# Patient Record
Sex: MALE | Race: WHITE | NOT HISPANIC OR LATINO | Employment: FULL TIME | URBAN - METROPOLITAN AREA
[De-identification: names, ages, dates, MRNs, and addresses within clinical notes are randomized per-mention and may not be internally consistent; named-entity substitution may affect disease eponyms.]

---

## 2020-08-19 LAB
ALT SERPL-CCNC: 65 U/L (ref 7–55)
AST SERPL-CCNC: 45 U/L (ref 8–48)

## 2020-09-17 LAB — COLOGUARD-ABSTRACT: POSITIVE

## 2021-06-02 ENCOUNTER — TRANSFERRED RECORDS (OUTPATIENT)
Dept: HEALTH INFORMATION MANAGEMENT | Facility: CLINIC | Age: 66
End: 2021-06-02

## 2021-06-11 ENCOUNTER — RECORDS - HEALTHEAST (OUTPATIENT)
Dept: ADMINISTRATIVE | Facility: OTHER | Age: 66
End: 2021-06-11

## 2021-06-29 DIAGNOSIS — Z11.59 ENCOUNTER FOR SCREENING FOR OTHER VIRAL DISEASES: ICD-10-CM

## 2021-07-12 ENCOUNTER — TRANSFERRED RECORDS (OUTPATIENT)
Dept: MULTI SPECIALTY CLINIC | Facility: CLINIC | Age: 66
End: 2021-07-12

## 2021-07-12 LAB
CREATININE (EXTERNAL): 1.06 MG/DL (ref 0.74–1.35)
GFR ESTIMATED (EXTERNAL): 74 ML/MIN/BSA
GFR ESTIMATED (IF AFRICAN AMERICAN) (EXTERNAL): 85 ML/MIN/BSA
GLUCOSE (EXTERNAL): 120 MG/DL (ref 70–140)
POTASSIUM (EXTERNAL): 3.4 MMOL/L (ref 3.6–5.2)

## 2021-07-21 RX ORDER — ATENOLOL AND CHLORTHALIDONE TABLET 50; 25 MG/1; MG/1
1 TABLET ORAL 2 TIMES DAILY
COMMUNITY

## 2021-07-21 RX ORDER — INDOMETHACIN 50 MG/1
50 CAPSULE ORAL 3 TIMES DAILY PRN
COMMUNITY

## 2021-07-21 RX ORDER — POTASSIUM CHLORIDE 1500 MG/1
20 TABLET, EXTENDED RELEASE ORAL
COMMUNITY

## 2021-07-21 ASSESSMENT — MIFFLIN-ST. JEOR: SCORE: 2047.05

## 2021-07-21 NOTE — PROVIDER NOTIFICATION
07/21/21 0902   Discharge Planning   Patient/Family Anticipates Transition to home with family   Living Arrangements   People in home spouse   Type of Residence Private Residence   Is your private residence a single family home or apartment? Single family home   Number of Stairs, Within Home, Primary none   Once home, are you able to live on one level? Yes   Which level? Main Level   Bathroom Shower/Tub Walk-in shower   Equipment Currently Used at Home shower chair;grab bar, tub/shower;raised toilet seat  (walker and cane at home to use)   Support System   Support Systems Spouse/Significant Other;Family Members   Do you have someone available to stay with you one or two nights once you are home? Yes   Blood   Known bleeding disorder or coagulopathy? No   Education   Patient attended total joint pre-op class/received pre-op teaching  email/phone call

## 2021-07-26 ENCOUNTER — ANESTHESIA EVENT (OUTPATIENT)
Dept: SURGERY | Facility: CLINIC | Age: 66
End: 2021-07-26
Payer: COMMERCIAL

## 2021-07-26 RX ORDER — CEFAZOLIN SODIUM IN 0.9 % NACL 3 G/100 ML
3 INTRAVENOUS SOLUTION, PIGGYBACK (ML) INTRAVENOUS SEE ADMIN INSTRUCTIONS
Status: DISCONTINUED | OUTPATIENT
Start: 2021-07-27 | End: 2021-07-27 | Stop reason: HOSPADM

## 2021-07-26 RX ORDER — CEFAZOLIN SODIUM IN 0.9 % NACL 3 G/100 ML
3 INTRAVENOUS SOLUTION, PIGGYBACK (ML) INTRAVENOUS
Status: COMPLETED | OUTPATIENT
Start: 2021-07-27 | End: 2021-07-27

## 2021-07-27 ENCOUNTER — APPOINTMENT (OUTPATIENT)
Dept: RADIOLOGY | Facility: CLINIC | Age: 66
End: 2021-07-27
Attending: ORTHOPAEDIC SURGERY
Payer: COMMERCIAL

## 2021-07-27 ENCOUNTER — HOSPITAL ENCOUNTER (OUTPATIENT)
Facility: CLINIC | Age: 66
Discharge: HOME | End: 2021-07-28
Attending: ORTHOPAEDIC SURGERY | Admitting: ORTHOPAEDIC SURGERY
Payer: COMMERCIAL

## 2021-07-27 ENCOUNTER — ANESTHESIA (OUTPATIENT)
Dept: SURGERY | Facility: CLINIC | Age: 66
End: 2021-07-27
Payer: COMMERCIAL

## 2021-07-27 ENCOUNTER — APPOINTMENT (OUTPATIENT)
Dept: PHYSICAL THERAPY | Facility: CLINIC | Age: 66
End: 2021-07-27
Attending: ORTHOPAEDIC SURGERY
Payer: COMMERCIAL

## 2021-07-27 DIAGNOSIS — M16.11 PRIMARY OSTEOARTHRITIS OF RIGHT HIP: Primary | ICD-10-CM

## 2021-07-27 PROBLEM — M16.10 HIP ARTHRITIS: Status: ACTIVE | Noted: 2021-07-27

## 2021-07-27 LAB — HOLD SPECIMEN: NORMAL

## 2021-07-27 PROCEDURE — 258N000003 HC RX IP 258 OP 636: Performed by: ANESTHESIOLOGY

## 2021-07-27 PROCEDURE — 250N000009 HC RX 250: Performed by: ORTHOPAEDIC SURGERY

## 2021-07-27 PROCEDURE — 258N000003 HC RX IP 258 OP 636: Performed by: ORTHOPAEDIC SURGERY

## 2021-07-27 PROCEDURE — 278N000051 HC OR IMPLANT GENERAL: Performed by: ORTHOPAEDIC SURGERY

## 2021-07-27 PROCEDURE — C1713 ANCHOR/SCREW BN/BN,TIS/BN: HCPCS | Performed by: ORTHOPAEDIC SURGERY

## 2021-07-27 PROCEDURE — 250N000011 HC RX IP 250 OP 636: Performed by: ANESTHESIOLOGY

## 2021-07-27 PROCEDURE — 710N000010 HC RECOVERY PHASE 1, LEVEL 2, PER MIN: Performed by: ORTHOPAEDIC SURGERY

## 2021-07-27 PROCEDURE — 272N000001 HC OR GENERAL SUPPLY STERILE: Performed by: ORTHOPAEDIC SURGERY

## 2021-07-27 PROCEDURE — 97116 GAIT TRAINING THERAPY: CPT | Mod: GP

## 2021-07-27 PROCEDURE — 250N000011 HC RX IP 250 OP 636: Performed by: ORTHOPAEDIC SURGERY

## 2021-07-27 PROCEDURE — 250N000013 HC RX MED GY IP 250 OP 250 PS 637: Performed by: ORTHOPAEDIC SURGERY

## 2021-07-27 PROCEDURE — 97110 THERAPEUTIC EXERCISES: CPT | Mod: GP

## 2021-07-27 PROCEDURE — 250N000013 HC RX MED GY IP 250 OP 250 PS 637: Performed by: ANESTHESIOLOGY

## 2021-07-27 PROCEDURE — 97161 PT EVAL LOW COMPLEX 20 MIN: CPT | Mod: GP

## 2021-07-27 PROCEDURE — 36415 COLL VENOUS BLD VENIPUNCTURE: CPT | Performed by: ORTHOPAEDIC SURGERY

## 2021-07-27 PROCEDURE — 250N000009 HC RX 250: Performed by: PHYSICIAN ASSISTANT

## 2021-07-27 PROCEDURE — 250N000009 HC RX 250: Performed by: NURSE ANESTHETIST, CERTIFIED REGISTERED

## 2021-07-27 PROCEDURE — 250N000011 HC RX IP 250 OP 636: Performed by: NURSE ANESTHETIST, CERTIFIED REGISTERED

## 2021-07-27 PROCEDURE — 370N000017 HC ANESTHESIA TECHNICAL FEE, PER MIN: Performed by: ORTHOPAEDIC SURGERY

## 2021-07-27 PROCEDURE — 250N000013 HC RX MED GY IP 250 OP 250 PS 637: Performed by: PHYSICIAN ASSISTANT

## 2021-07-27 PROCEDURE — C1776 JOINT DEVICE (IMPLANTABLE): HCPCS | Performed by: ORTHOPAEDIC SURGERY

## 2021-07-27 PROCEDURE — 250N000009 HC RX 250: Performed by: ANESTHESIOLOGY

## 2021-07-27 PROCEDURE — 999N000065 XR PELVIS AND HIP LEFT 2 VIEWS

## 2021-07-27 PROCEDURE — 360N000077 HC SURGERY LEVEL 4, PER MIN: Performed by: ORTHOPAEDIC SURGERY

## 2021-07-27 PROCEDURE — 258N000001 HC RX 258: Performed by: ORTHOPAEDIC SURGERY

## 2021-07-27 PROCEDURE — 999N000141 HC STATISTIC PRE-PROCEDURE NURSING ASSESSMENT: Performed by: ORTHOPAEDIC SURGERY

## 2021-07-27 PROCEDURE — 250N000011 HC RX IP 250 OP 636: Performed by: PHYSICIAN ASSISTANT

## 2021-07-27 DEVICE — IMP HOLE COVER SNN ACETAB CUP REFLEC INTERFIT 71336500: Type: IMPLANTABLE DEVICE | Site: HIP | Status: FUNCTIONAL

## 2021-07-27 DEVICE — IMP FEMORAL SLEEVE S&N SHORT MOD +0MM NECK TI 71344247: Type: IMPLANTABLE DEVICE | Site: HIP | Status: FUNCTIONAL

## 2021-07-27 DEVICE — IMP SCR ACET SNN SPHERICAL HEAD 6.5X25MM 71332525: Type: IMPLANTABLE DEVICE | Site: HIP | Status: FUNCTIONAL

## 2021-07-27 DEVICE — IMP SHELL SNR ACET R3 3H 58MM 71335558: Type: IMPLANTABLE DEVICE | Site: HIP | Status: FUNCTIONAL

## 2021-07-27 DEVICE — IMP SCR ACET SNN SPHERICAL HEAD 6.5X15MM 71332515: Type: IMPLANTABLE DEVICE | Site: HIP | Status: FUNCTIONAL

## 2021-07-27 DEVICE — IMPLANTABLE DEVICE: Type: IMPLANTABLE DEVICE | Site: HIP | Status: FUNCTIONAL

## 2021-07-27 DEVICE — HEAD OX MODULAR 40MM: Type: IMPLANTABLE DEVICE | Site: HIP | Status: FUNCTIONAL

## 2021-07-27 RX ORDER — PROPOFOL 10 MG/ML
INJECTION, EMULSION INTRAVENOUS CONTINUOUS PRN
Status: DISCONTINUED | OUTPATIENT
Start: 2021-07-27 | End: 2021-07-27

## 2021-07-27 RX ORDER — TRANEXAMIC ACID 650 MG/1
1950 TABLET ORAL ONCE
Status: COMPLETED | OUTPATIENT
Start: 2021-07-27 | End: 2021-07-27

## 2021-07-27 RX ORDER — ONDANSETRON 4 MG/1
4 TABLET, ORALLY DISINTEGRATING ORAL EVERY 6 HOURS PRN
Status: DISCONTINUED | OUTPATIENT
Start: 2021-07-27 | End: 2021-07-28 | Stop reason: HOSPADM

## 2021-07-27 RX ORDER — ACETAMINOPHEN 325 MG/1
975 TABLET ORAL EVERY 8 HOURS
Status: DISCONTINUED | OUTPATIENT
Start: 2021-07-27 | End: 2021-07-28 | Stop reason: HOSPADM

## 2021-07-27 RX ORDER — LIDOCAINE 40 MG/G
CREAM TOPICAL
Status: DISCONTINUED | OUTPATIENT
Start: 2021-07-27 | End: 2021-07-28 | Stop reason: HOSPADM

## 2021-07-27 RX ORDER — EPHEDRINE SULFATE 50 MG/ML
INJECTION, SOLUTION INTRAMUSCULAR; INTRAVENOUS; SUBCUTANEOUS PRN
Status: DISCONTINUED | OUTPATIENT
Start: 2021-07-27 | End: 2021-07-27

## 2021-07-27 RX ORDER — POLYETHYLENE GLYCOL 3350 17 G/17G
17 POWDER, FOR SOLUTION ORAL DAILY
Status: DISCONTINUED | OUTPATIENT
Start: 2021-07-28 | End: 2021-07-28 | Stop reason: HOSPADM

## 2021-07-27 RX ORDER — SODIUM CHLORIDE, SODIUM LACTATE, POTASSIUM CHLORIDE, CALCIUM CHLORIDE 600; 310; 30; 20 MG/100ML; MG/100ML; MG/100ML; MG/100ML
INJECTION, SOLUTION INTRAVENOUS CONTINUOUS
Status: DISCONTINUED | OUTPATIENT
Start: 2021-07-27 | End: 2021-07-27 | Stop reason: HOSPADM

## 2021-07-27 RX ORDER — ASPIRIN 81 MG/1
81 TABLET ORAL 2 TIMES DAILY
Status: DISCONTINUED | OUTPATIENT
Start: 2021-07-27 | End: 2021-07-28 | Stop reason: HOSPADM

## 2021-07-27 RX ORDER — HYDROMORPHONE HCL IN WATER/PF 6 MG/30 ML
0.2 PATIENT CONTROLLED ANALGESIA SYRINGE INTRAVENOUS
Status: DISCONTINUED | OUTPATIENT
Start: 2021-07-27 | End: 2021-07-28 | Stop reason: HOSPADM

## 2021-07-27 RX ORDER — GLYCOPYRROLATE 0.2 MG/ML
INJECTION, SOLUTION INTRAMUSCULAR; INTRAVENOUS PRN
Status: DISCONTINUED | OUTPATIENT
Start: 2021-07-27 | End: 2021-07-27

## 2021-07-27 RX ORDER — PROCHLORPERAZINE MALEATE 10 MG
10 TABLET ORAL EVERY 6 HOURS PRN
Status: DISCONTINUED | OUTPATIENT
Start: 2021-07-27 | End: 2021-07-28 | Stop reason: HOSPADM

## 2021-07-27 RX ORDER — NALOXONE HYDROCHLORIDE 0.4 MG/ML
0.2 INJECTION, SOLUTION INTRAMUSCULAR; INTRAVENOUS; SUBCUTANEOUS
Status: DISCONTINUED | OUTPATIENT
Start: 2021-07-27 | End: 2021-07-28 | Stop reason: HOSPADM

## 2021-07-27 RX ORDER — ACETAMINOPHEN 325 MG/1
650 TABLET ORAL EVERY 4 HOURS PRN
Status: DISCONTINUED | OUTPATIENT
Start: 2021-07-30 | End: 2021-07-28 | Stop reason: HOSPADM

## 2021-07-27 RX ORDER — SODIUM CHLORIDE, SODIUM LACTATE, POTASSIUM CHLORIDE, CALCIUM CHLORIDE 600; 310; 30; 20 MG/100ML; MG/100ML; MG/100ML; MG/100ML
INJECTION, SOLUTION INTRAVENOUS CONTINUOUS
Status: DISCONTINUED | OUTPATIENT
Start: 2021-07-27 | End: 2021-07-28 | Stop reason: HOSPADM

## 2021-07-27 RX ORDER — MAGNESIUM SULFATE 4 G/50ML
4 INJECTION INTRAVENOUS ONCE
Status: COMPLETED | OUTPATIENT
Start: 2021-07-27 | End: 2021-07-28

## 2021-07-27 RX ORDER — FENTANYL CITRATE 50 UG/ML
INJECTION, SOLUTION INTRAMUSCULAR; INTRAVENOUS PRN
Status: DISCONTINUED | OUTPATIENT
Start: 2021-07-27 | End: 2021-07-27

## 2021-07-27 RX ORDER — FENTANYL CITRATE 50 UG/ML
50 INJECTION, SOLUTION INTRAMUSCULAR; INTRAVENOUS EVERY 5 MIN PRN
Status: DISCONTINUED | OUTPATIENT
Start: 2021-07-27 | End: 2021-07-27 | Stop reason: HOSPADM

## 2021-07-27 RX ORDER — CEFAZOLIN SODIUM 2 G/100ML
2 INJECTION, SOLUTION INTRAVENOUS EVERY 8 HOURS
Status: COMPLETED | OUTPATIENT
Start: 2021-07-27 | End: 2021-07-28

## 2021-07-27 RX ORDER — OXYCODONE HYDROCHLORIDE 5 MG/1
10 TABLET ORAL EVERY 4 HOURS PRN
Status: DISCONTINUED | OUTPATIENT
Start: 2021-07-27 | End: 2021-07-28 | Stop reason: HOSPADM

## 2021-07-27 RX ORDER — NALOXONE HYDROCHLORIDE 0.4 MG/ML
0.4 INJECTION, SOLUTION INTRAMUSCULAR; INTRAVENOUS; SUBCUTANEOUS
Status: DISCONTINUED | OUTPATIENT
Start: 2021-07-27 | End: 2021-07-28 | Stop reason: HOSPADM

## 2021-07-27 RX ORDER — BUPIVACAINE HYDROCHLORIDE 7.5 MG/ML
INJECTION, SOLUTION INTRASPINAL PRN
Status: DISCONTINUED | OUTPATIENT
Start: 2021-07-27 | End: 2021-07-27

## 2021-07-27 RX ORDER — BISACODYL 10 MG
10 SUPPOSITORY, RECTAL RECTAL DAILY PRN
Status: DISCONTINUED | OUTPATIENT
Start: 2021-07-27 | End: 2021-07-28 | Stop reason: HOSPADM

## 2021-07-27 RX ORDER — BUPIVACAINE HYDROCHLORIDE 7.5 MG/ML
INJECTION, SOLUTION INTRASPINAL
Status: COMPLETED | OUTPATIENT
Start: 2021-07-27 | End: 2021-07-27

## 2021-07-27 RX ORDER — MAGNESIUM HYDROXIDE 1200 MG/15ML
LIQUID ORAL PRN
Status: DISCONTINUED | OUTPATIENT
Start: 2021-07-27 | End: 2021-07-27 | Stop reason: HOSPADM

## 2021-07-27 RX ORDER — ONDANSETRON 4 MG/1
4 TABLET, ORALLY DISINTEGRATING ORAL EVERY 30 MIN PRN
Status: DISCONTINUED | OUTPATIENT
Start: 2021-07-27 | End: 2021-07-27 | Stop reason: HOSPADM

## 2021-07-27 RX ORDER — DEXAMETHASONE SODIUM PHOSPHATE 10 MG/ML
INJECTION, SOLUTION INTRAMUSCULAR; INTRAVENOUS PRN
Status: DISCONTINUED | OUTPATIENT
Start: 2021-07-27 | End: 2021-07-27

## 2021-07-27 RX ORDER — ACETAMINOPHEN 325 MG/1
975 TABLET ORAL ONCE
Status: COMPLETED | OUTPATIENT
Start: 2021-07-27 | End: 2021-07-27

## 2021-07-27 RX ORDER — OXYCODONE HYDROCHLORIDE 5 MG/1
5 TABLET ORAL EVERY 4 HOURS PRN
Status: DISCONTINUED | OUTPATIENT
Start: 2021-07-27 | End: 2021-07-28 | Stop reason: HOSPADM

## 2021-07-27 RX ORDER — ONDANSETRON 2 MG/ML
4 INJECTION INTRAMUSCULAR; INTRAVENOUS EVERY 30 MIN PRN
Status: DISCONTINUED | OUTPATIENT
Start: 2021-07-27 | End: 2021-07-27 | Stop reason: HOSPADM

## 2021-07-27 RX ORDER — ONDANSETRON 2 MG/ML
4 INJECTION INTRAMUSCULAR; INTRAVENOUS EVERY 6 HOURS PRN
Status: DISCONTINUED | OUTPATIENT
Start: 2021-07-27 | End: 2021-07-28 | Stop reason: HOSPADM

## 2021-07-27 RX ORDER — HYDROMORPHONE HCL IN WATER/PF 6 MG/30 ML
0.4 PATIENT CONTROLLED ANALGESIA SYRINGE INTRAVENOUS
Status: DISCONTINUED | OUTPATIENT
Start: 2021-07-27 | End: 2021-07-28 | Stop reason: HOSPADM

## 2021-07-27 RX ORDER — HYDROMORPHONE HCL IN WATER/PF 6 MG/30 ML
0.2 PATIENT CONTROLLED ANALGESIA SYRINGE INTRAVENOUS EVERY 5 MIN PRN
Status: DISCONTINUED | OUTPATIENT
Start: 2021-07-27 | End: 2021-07-27 | Stop reason: HOSPADM

## 2021-07-27 RX ORDER — AMOXICILLIN 250 MG
1 CAPSULE ORAL 2 TIMES DAILY
Status: DISCONTINUED | OUTPATIENT
Start: 2021-07-27 | End: 2021-07-28 | Stop reason: HOSPADM

## 2021-07-27 RX ORDER — ONDANSETRON 2 MG/ML
INJECTION INTRAMUSCULAR; INTRAVENOUS PRN
Status: DISCONTINUED | OUTPATIENT
Start: 2021-07-27 | End: 2021-07-27

## 2021-07-27 RX ORDER — LIDOCAINE 40 MG/G
CREAM TOPICAL
Status: DISCONTINUED | OUTPATIENT
Start: 2021-07-27 | End: 2021-07-27 | Stop reason: HOSPADM

## 2021-07-27 RX ADMIN — ACETAMINOPHEN 975 MG: 325 TABLET ORAL at 06:13

## 2021-07-27 RX ADMIN — SODIUM CHLORIDE, POTASSIUM CHLORIDE, SODIUM LACTATE AND CALCIUM CHLORIDE: 600; 310; 30; 20 INJECTION, SOLUTION INTRAVENOUS at 14:18

## 2021-07-27 RX ADMIN — ACETAMINOPHEN 975 MG: 325 TABLET ORAL at 14:39

## 2021-07-27 RX ADMIN — GLYCOPYRROLATE 0.3 MG: 0.2 INJECTION, SOLUTION INTRAMUSCULAR; INTRAVENOUS at 07:34

## 2021-07-27 RX ADMIN — MIDAZOLAM HYDROCHLORIDE 2 MG: 1 INJECTION, SOLUTION INTRAMUSCULAR; INTRAVENOUS at 07:22

## 2021-07-27 RX ADMIN — ONDANSETRON 4 MG: 2 INJECTION INTRAMUSCULAR; INTRAVENOUS at 07:37

## 2021-07-27 RX ADMIN — Medication 5 MG: at 08:20

## 2021-07-27 RX ADMIN — ACETAMINOPHEN 975 MG: 325 TABLET ORAL at 23:10

## 2021-07-27 RX ADMIN — MAGNESIUM SULFATE HEPTAHYDRATE 4 G: 80 INJECTION, SOLUTION INTRAVENOUS at 06:46

## 2021-07-27 RX ADMIN — DOCUSATE SODIUM 50MG AND SENNOSIDES 8.6MG 1 TABLET: 8.6; 5 TABLET, FILM COATED ORAL at 14:41

## 2021-07-27 RX ADMIN — ASPIRIN 81 MG: 81 TABLET ORAL at 14:39

## 2021-07-27 RX ADMIN — DEXAMETHASONE SODIUM PHOSPHATE 10 MG: 10 INJECTION, SOLUTION INTRAMUSCULAR; INTRAVENOUS at 07:33

## 2021-07-27 RX ADMIN — DOCUSATE SODIUM 50MG AND SENNOSIDES 8.6MG 1 TABLET: 8.6; 5 TABLET, FILM COATED ORAL at 20:38

## 2021-07-27 RX ADMIN — Medication 10 MG: at 07:54

## 2021-07-27 RX ADMIN — FENTANYL CITRATE 100 MCG: 50 INJECTION, SOLUTION INTRAMUSCULAR; INTRAVENOUS at 07:26

## 2021-07-27 RX ADMIN — SODIUM CHLORIDE, POTASSIUM CHLORIDE, SODIUM LACTATE AND CALCIUM CHLORIDE: 600; 310; 30; 20 INJECTION, SOLUTION INTRAVENOUS at 08:20

## 2021-07-27 RX ADMIN — BUPIVACAINE HYDROCHLORIDE IN DEXTROSE 2 ML: 7.5 INJECTION, SOLUTION SUBARACHNOID at 07:32

## 2021-07-27 RX ADMIN — TRANEXAMIC ACID 1950 MG: 650 TABLET ORAL at 06:13

## 2021-07-27 RX ADMIN — Medication 10 MG: at 08:06

## 2021-07-27 RX ADMIN — ASPIRIN 81 MG: 81 TABLET ORAL at 20:38

## 2021-07-27 RX ADMIN — CEFAZOLIN SODIUM 2 G: 2 INJECTION, SOLUTION INTRAVENOUS at 23:10

## 2021-07-27 RX ADMIN — CEFAZOLIN SODIUM 2 G: 2 INJECTION, SOLUTION INTRAVENOUS at 14:40

## 2021-07-27 RX ADMIN — SODIUM CHLORIDE, POTASSIUM CHLORIDE, SODIUM LACTATE AND CALCIUM CHLORIDE: 600; 310; 30; 20 INJECTION, SOLUTION INTRAVENOUS at 06:46

## 2021-07-27 RX ADMIN — LIDOCAINE HYDROCHLORIDE 30 ML: 10 INJECTION, SOLUTION INFILTRATION; PERINEURAL at 07:36

## 2021-07-27 RX ADMIN — PROPOFOL 80 MCG/KG/MIN: 10 INJECTION, EMULSION INTRAVENOUS at 07:31

## 2021-07-27 RX ADMIN — Medication 3 G: at 07:30

## 2021-07-27 ASSESSMENT — MIFFLIN-ST. JEOR: SCORE: 1988.3

## 2021-07-27 NOTE — ANESTHESIA POSTPROCEDURE EVALUATION
Patient: Aron Jones    Procedure(s):  LEFT TOTAL HIP ARTHROPLASTY    Diagnosis:Osteoarthritis of left hip [M16.12]  Diagnosis Additional Information: No value filed.    Anesthesia Type:  Spinal    Note:     Postop Pain Control: Uneventful            Sign Out: Well controlled pain   PONV: No   Neuro/Psych: Uneventful            Sign Out: Acceptable/Baseline neuro status   Airway/Respiratory: Uneventful            Sign Out: AIRWAY IN SITU/Resp. Support   CV/Hemodynamics: Uneventful            Sign Out: Acceptable CV status; No obvious hypovolemia; No obvious fluid overload   Other NRE: NONE   DID A NON-ROUTINE EVENT OCCUR? No           Last vitals:  Vitals Value Taken Time   /61 07/27/21 1120   Temp 36.4  C (97.6  F) 07/27/21 1100   Pulse 50 07/27/21 1121   Resp 20 07/27/21 1100   SpO2 97 % 07/27/21 1121   Vitals shown include unvalidated device data.    Electronically Signed By: Mehrdad Martinez MD  July 27, 2021  2:11 PM

## 2021-07-27 NOTE — ANESTHESIA PREPROCEDURE EVALUATION
Anesthesia Pre-Procedure Evaluation    Patient: Aron Jones   MRN: [id number] : 1955        Preoperative Diagnosis: Osteoarthritis of left hip [M16.12]   Procedure : Procedure(s):  LEFT TOTAL HIP ARTHROPLASTY     Past Medical History:   Diagnosis Date     Arthritis      Hemochromatosis      Hypertension       History reviewed. No pertinent surgical history.   No Known Allergies   Social History     Tobacco Use     Smoking status: Former Smoker     Types: Cigars     Smokeless tobacco: Never Used   Substance Use Topics     Alcohol use: Yes     Comment: 2 drinks a month      Wt Readings from Last 1 Encounters:   21 122.4 kg (269 lb 12.8 oz)        Anesthesia Evaluation            ROS/MED HX  ENT/Pulmonary:  - neg pulmonary ROS     Neurologic:  - neg neurologic ROS     Cardiovascular:     (+) hypertension-----    METS/Exercise Tolerance:     Hematologic:  - neg hematologic  ROS     Musculoskeletal:  - neg musculoskeletal ROS     GI/Hepatic:  - neg GI/hepatic ROS     Renal/Genitourinary:  - neg Renal ROS     Endo: Comment: Hemochromatosis    (+) Obesity (morbid, BMI>40),     Psychiatric/Substance Use:  - neg psychiatric ROS     Infectious Disease:  - neg infectious disease ROS     Malignancy:  - neg malignancy ROS     Other:  - neg other ROS          Physical Exam    Airway        Mallampati: III   TM distance: > 3 FB   Neck ROM: full     Respiratory Devices and Support         Dental       (+) upper dentures      Cardiovascular          Rhythm and rate: regular and normal     Pulmonary           breath sounds clear to auscultation           OUTSIDE LABS:  CBC: No results found for: WBC, HGB, HCT, PLT  BMP: No results found for: NA, POTASSIUM, CHLORIDE, CO2, BUN, CR, GLC  COAGS: No results found for: PTT, INR, FIBR  POC: No results found for: BGM, HCG, HCGS  HEPATIC: No results found for: ALBUMIN, PROTTOTAL, ALT, AST, GGT, ALKPHOS, BILITOTAL, BILIDIRECT, DORENE  OTHER: No results found for: PH,  LACT, A1C, FANTASMA, PHOS, MAG, LIPASE, AMYLASE, TSH, T4, T3, CRP, SED    Anesthesia Plan    ASA Status:  3   NPO Status:  NPO Appropriate    Anesthesia Type: Spinal.              Consents    Anesthesia Plan(s) and associated risks, benefits, and realistic alternatives discussed. Questions answered and patient/representative(s) expressed understanding.     - Discussed with:  Patient         Postoperative Care    Pain management: IV analgesics, Oral pain medications.   PONV prophylaxis: Ondansetron (or other 5HT-3), Dexamethasone or Solumedrol     Comments:                Mehrdad Martinez MD

## 2021-07-27 NOTE — ANESTHESIA CARE TRANSFER NOTE
Patient: Aron Jones    Procedure(s):  LEFT TOTAL HIP ARTHROPLASTY    Diagnosis: Osteoarthritis of left hip [M16.12]  Diagnosis Additional Information: No value filed.    Anesthesia Type:   Spinal     Note:    Oropharynx: spontaneously breathing and oropharynx clear of all foreign objects  Level of Consciousness: awake  Oxygen Supplementation: face mask  Level of Supplemental Oxygen (L/min / FiO2): 6      Vital Signs Stable: post-procedure vital signs reviewed and stable  Report to RN Given: handoff report given  Patient transferred to: PACU    Handoff Report: Identifed the Patient, Identified the Reponsible Provider, Reviewed the pertinent medical history, Discussed the surgical course, Reviewed Intra-OP anesthesia mangement and issues during anesthesia, Set expectations for post-procedure period and Allowed opportunity for questions and acknowledgement of understanding      Vitals:  Vitals Value Taken Time   /59 07/27/21 0938   Temp 35.6  C (96.1  F) 07/27/21 0938   Pulse 55 07/27/21 0938   Resp 18 07/27/21 0938   SpO2 99 % 07/27/21 0938       Electronically Signed By: KIMBERLY Bustamante CRNA  July 27, 2021  9:39 AM

## 2021-07-27 NOTE — ANESTHESIA PROCEDURE NOTES
Intrathecal Procedure Note  Pre-Procedure   Staff -        Anesthesiologist:  Mehrdad Martinez MD       Performed By: anesthesiologist       Location: OR       Procedure Start/Stop Times: 7/27/2021 7:28 AM and 7/27/2021 7:32 AM       Pre-Anesthestic Checklist: patient identified, IV checked, risks and benefits discussed, informed consent, monitors and equipment checked, pre-op evaluation and at physician/surgeon's request  Timeout:       Correct Patient: Yes        Correct Procedure: Yes        Correct Site: Yes        Correct Position: Yes   Procedure Documentation  Procedure: intrathecal       Patient Position: sitting       Patient Prep/Sterile Barriers: sterile gloves, mask, patient draped       Skin prep: Chloraprep       Insertion Site: L3-4. (midline approach).       Needle Gauge: 24.        Needle Length (Inches): 3.5        Spinal Needle Type: Mela tip       Introducer used       Introducer: 20 G      # of attempts: 2 and  # of redirects:     Assessment/Narrative         Paresthesias: No.       CSF fluid: clear.    Medication(s) Administered   0.75% Hyperbaric Bupivacaine (Intrathecal), 2 mL  Medication Administration Time: 7/27/2021 7:32 AM

## 2021-07-27 NOTE — OR NURSING
Total hip arthroplasty bone cuts will be disposed of at the end of procedure in a yellow hazard bag. Surgeon stated the bone did not need to be sent to pathology.

## 2021-07-27 NOTE — BRIEF OP NOTE
[facility]    Brief Operative Note    Pre-operative diagnosis: Osteoarthritis of left hip [M16.12]  Post-operative diagnosis Same as pre-operative diagnosis    Procedure: Procedure(s):  LEFT TOTAL HIP ARTHROPLASTY  Surgeon: Surgeon(s) and Role:     * Erasto Max MD - Primary     * Ugo Chris PA-C - Assisting  Anesthesia: Spinal   Estimated blood loss: 200 ml  Drains: Hemovac  Specimens: None  Findings:   None.  Complications: None.  Implants:   Implant Name Type Inv. Item Serial No.  Lot No. LRB No. Used Action   GUIDE PIN LONG ACETABULAR - [id number] Wire GUIDE PIN LONG ACETABULAR  [employer] [id number] Left 1 Implanted   IMP SHELL SNR ACET R3 3H 58MM [id number] - [id number] Total Joint Component/Insert IMP SHELL SNR ACET R3 3H 58MM [id number]  [employer] [id number] Left 1 Implanted   LINER R3 20 DEG +4 XLPE ACET [id number] - [id number] Total Joint Component/Insert LINER R3 20 DEG +4 XLPE ACET [id number]  [employer] [id number] Left 1 Implanted   IMP HOLE COVER SNN ACETAB CUP REFLEC INTERFIT [id number] - [id number] Metallic Hardware/[place] IMP HOLE COVER SNN ACETAB CUP REFLEC INTERFIT [id number]  [employer] [id number] Left 1 Implanted   IMP SCR ACET SNN SPHERICAL HEAD 6.5X15MM [id number] - [id number] Metallic Hardware/[place] IMP SCR ACET SNN SPHERICAL HEAD 6.5X15MM [id number]  [employer] [id number] Left 1 Implanted   IMP SCR ACET SNN SPHERICAL HEAD 6.5X25MM [id number] - [id number] Metallic Hardware/[place] IMP SCR ACET SNN SPHERICAL HEAD 6.5X25MM [id number]  [employer] [id number] Left 1 Implanted   IMP STEM SNN SYNERGY STD POR PLUS SZ 15 165MM [id number] - [id number] Total Joint Component/Insert IMP STEM SNN SYNERGY STD POR PLUS SZ 15 165MM [id number]  [employer] [id number] Left 1 Implanted   IMP FEMORAL SLEEVE S&N SHORT MOD +0MM NECK TI [id number] - [id number] Total Joint Component/Insert IMP FEMORAL SLEEVE S&N SHORT MOD +0MM NECK  TI [id number]  [employer] [id number] Left 1 Implanted   HEAD OX MODULAR 40MM - [id number] Total Joint Component/Insert HEAD OX MODULAR 40MM  [employer] [id number] Left 1 Implanted       Plan:    WBAT  F/U in 2 weeks  Leave Aquacel for 10 days  DVT Prophylaxis:  ASA 81 mg PO BID for 40 day

## 2021-07-27 NOTE — OP NOTE
Procedure Date: 07/27/2021    PREOPERATIVE DIAGNOSIS:  Left hip primary osteoarthritis.    POSTOPERATIVE DIAGNOSIS:  Left hip primary osteoarthritis.    PROCEDURE:  Left MIS total hip arthroplasty.    SURGEON:  Erasto Max MD    ASSISTANT:  Ugo Chris PA-C (PA, was medically indicated and necessary to allow for the safe and adequate progression of surgery).    ESTIMATED BLOOD LOSS:  150 mL    COMPLICATIONS:  None.    SPECIMENS:  None.    ANESTHETIC:  Spinal.    IMPLANTS USED:  Smith and Nephew size 58 reflection, 3-hole cup +420 degree cross-linked liner, 40 inner diameter, 58 outer diameter, 15 and 25 mm screws, a 15 standard offset Synergy titanium press-fit stem with HA coating and a +0, 40 mm Oxinium femoral head.    DESCRIPTION OF PROCEDURE:  The patient was brought to the operating room.  After adequate anesthesia was induced, the patient was placed in the right lateral decubitus position.  Left lower extremity was prepped and draped in the usual sterile fashion.  MIS incision made through the skin and subcutaneous tissues.  A mid glute approach.  [place] retractors placed, took down the short external rotators.  T capsulotomy performed.  Saved the capsule for later repair.  Got a baseline length and offset using the Smith and Nephew length, offset device and then dislocated the hip.  Per preoperative templating femoral neck cut was made about a 14 mm above the lesser trochanter.  Femoral head removed.  Grade 4 chondromalacia noted.  We proceeded with carefully retracted femur anteriorly, did a labrectomy and osteophyte excision circumferentially around the acetabulum, medialized with a 47 reamer circumferentially reamed up to a 57 reamer.  Good bleeding subchondral bone encountered, so we impacted a 58 cup, anteversion angle of about 25 degrees, coverage angle of about 45 degrees.  Placed central hole cover and 2 screws in the superior posterior quadrant, each had excellent purchase and then turned  attention to locking the poly.  A central hole cover was placed as well.  Poly was locked with the elevated lip liner at about the 4 o'clock, maybe 5 o'clock position and then turned attention to the femur.  Cookie cutter, canal finder, lateralizing reamer, reamers up to a 15, broaches up to a 15.  Excellent fit, fill and stability with the 15 in place, trialed with a 15 standard zero versus a 15 high, flex to 90, internally rotate to 90 and it did not dislocate posteriorly.  Full extension, external rotation impinged for both options right around the same place.  The high offset did increase his offset slightly and the standard offset did not, length was increased by about 5 mm.  Clinically, he felt perfectly level maybe +1-2 compared to the opposite side.  This did not necessarily line up with our template, which that he was about 10 short on this side, but clinically, we clearly lengthened him.  He felt stable.  So with good stability with a standard offset,   I went with a standard offset, so as to not increase the offset and accepted a little bit of increased length.  At this juncture, went ahead and irrigated the canal after removing the trial implants and then proceeded with placing final implant.  Final implant was a 15 standard Synergy porous plus HA femoral stem.  It seated to within about 2 mm with a broach sat.  We went ahead and placed a 0 head.  We were adding about 5 mm of leg length as measured intraoperatively.  Offset was about the same as measured intraoperatively.  I went ahead and proceeded with impacting that 0 head, reduced the hip, irrigated the hip, repaired the posterior capsule side to side #1 Vicryl interrupted figure-of-eight.  Irrigated again.  Placed Hemovac drain, closed the gluteus fascia, #1 Vicryl interrupted figure-of-eight, #1 PDS interrupted horizontal mattress in the IT band, 2-0 Vicryl in layers, subcutaneous 3-0 Monocryl for subcuticular closure.  Steri-Strips, sterile  bandages applied.  The patient was awakened and returned to the postanesthesia care unit in excellent condition.  No apparent intraoperative complications.    Plan is routine total hip protocol.    Erasto Max MD        D: 2021   T: 2021   MT: sd    Name:     ERNESTO LÓPEZ  MRN:      [id number]        Account:        [id number]   :      1955           Procedure Date: 2021     Document: [id number]

## 2021-07-27 NOTE — PLAN OF CARE
Problem: Bleeding (Hip Arthroplasty)  Goal: Absence of Bleeding  Outcome: Improving     Problem: Bowel Motility Impaired (Hip Arthroplasty)  Goal: Effective Bowel Elimination  Outcome: Improving     Problem: Infection (Hip Arthroplasty)  Goal: Absence of Infection Signs and Symptoms  Outcome: Improving     Problem: Pain (Hip Arthroplasty)  Goal: Acceptable Pain Control  Outcome: Improving   Patient vital signs are at baseline: yes  Patient able to ambulate as they were prior to admission or with assist devices provided by therapies during their stay:  no  Patient MUST void prior to discharge:  no  Patient able to tolerate oral intake: yes  Pain has adequate pain control using Oral analgesics:  yes  Rl Cuevas RN  7/27/2021  2:52 PM

## 2021-07-27 NOTE — PHARMACY-ADMISSION MEDICATION HISTORY
Pharmacy Note - Admission Medication History    Pertinent Provider Information: none   ______________________________________________________________________    Prior To Admission (PTA) med list completed and updated in EMR.       Prior to Admission Medications   Prescriptions Last Dose Informant Patient Reported? Taking?   atenolol-chlorthalidone (TENORETIC) 50-25 MG tablet 7/27/2021 at x1  Yes Yes   Sig: Take 1 tablet by mouth 2 times daily   indomethacin (INDOCIN) 50 MG capsule Past Month at Unknown time  Yes Yes   Sig: Take 50 mg by mouth 3 times daily as needed for moderate pain   potassium chloride ER (K-TAB) 20 MEQ CR tablet 7/26/2021 at Unknown time  Yes Yes   Sig: Take 20 mEq by mouth daily (with breakfast)      Facility-Administered Medications: None       Information source(s): Patient, CareEverywhere/SureScripts and H&P    Method of interview communication: in-person    Patient was asked about OTC/herbal products specifically.  PTA med list reflects this.    Based on the pharmacist's assessment, the PTA med list information appears reliable    Allergies were reviewed, assessed, and updated with the patient.      Patient does not use any multi-dose medications prior to admission.     Thank you for the opportunity to participate in the care of this patient.      Sintia Judd [facility]     7/27/2021     7:16 AM

## 2021-07-28 ENCOUNTER — APPOINTMENT (OUTPATIENT)
Dept: OCCUPATIONAL THERAPY | Facility: CLINIC | Age: 66
End: 2021-07-28
Attending: ORTHOPAEDIC SURGERY
Payer: COMMERCIAL

## 2021-07-28 ENCOUNTER — APPOINTMENT (OUTPATIENT)
Dept: PHYSICAL THERAPY | Facility: CLINIC | Age: 66
End: 2021-07-28
Attending: ORTHOPAEDIC SURGERY
Payer: COMMERCIAL

## 2021-07-28 VITALS
HEIGHT: 68 IN | OXYGEN SATURATION: 95 % | DIASTOLIC BLOOD PRESSURE: 57 MMHG | TEMPERATURE: 97.9 F | BODY MASS INDEX: 40.89 KG/M2 | SYSTOLIC BLOOD PRESSURE: 120 MMHG | RESPIRATION RATE: 18 BRPM | HEART RATE: 57 BPM | WEIGHT: 269.8 LBS

## 2021-07-28 LAB
FASTING STATUS PATIENT QL REPORTED: ABNORMAL
GLUCOSE BLD-MCNC: 137 MG/DL (ref 70–125)
HGB BLD-MCNC: 13.6 G/DL (ref 13.3–17.7)

## 2021-07-28 PROCEDURE — 97166 OT EVAL MOD COMPLEX 45 MIN: CPT | Mod: GO

## 2021-07-28 PROCEDURE — 36415 COLL VENOUS BLD VENIPUNCTURE: CPT | Performed by: ORTHOPAEDIC SURGERY

## 2021-07-28 PROCEDURE — 97535 SELF CARE MNGMENT TRAINING: CPT | Mod: GO

## 2021-07-28 PROCEDURE — 97116 GAIT TRAINING THERAPY: CPT | Mod: GP

## 2021-07-28 PROCEDURE — 250N000013 HC RX MED GY IP 250 OP 250 PS 637: Performed by: ORTHOPAEDIC SURGERY

## 2021-07-28 PROCEDURE — 85018 HEMOGLOBIN: CPT | Performed by: ORTHOPAEDIC SURGERY

## 2021-07-28 PROCEDURE — 97110 THERAPEUTIC EXERCISES: CPT | Mod: GP

## 2021-07-28 PROCEDURE — 82947 ASSAY GLUCOSE BLOOD QUANT: CPT | Performed by: ORTHOPAEDIC SURGERY

## 2021-07-28 RX ORDER — OXYCODONE HYDROCHLORIDE 5 MG/1
5 TABLET ORAL EVERY 4 HOURS PRN
Qty: 6 TABLET | Refills: 0 | Status: SHIPPED | OUTPATIENT
Start: 2021-07-28 | End: 2021-07-31

## 2021-07-28 RX ORDER — ACETAMINOPHEN 325 MG/1
975 TABLET ORAL EVERY 6 HOURS
COMMUNITY
Start: 2021-07-28

## 2021-07-28 RX ORDER — AMOXICILLIN 250 MG
1 CAPSULE ORAL 2 TIMES DAILY PRN
COMMUNITY
Start: 2021-07-28

## 2021-07-28 RX ADMIN — DOCUSATE SODIUM 50MG AND SENNOSIDES 8.6MG 1 TABLET: 8.6; 5 TABLET, FILM COATED ORAL at 10:03

## 2021-07-28 RX ADMIN — ACETAMINOPHEN 975 MG: 325 TABLET ORAL at 06:30

## 2021-07-28 RX ADMIN — ASPIRIN 81 MG: 81 TABLET ORAL at 10:03

## 2021-07-28 RX ADMIN — POLYETHYLENE GLYCOL 3350 17 G: 17 POWDER, FOR SOLUTION ORAL at 10:03

## 2021-07-28 NOTE — DISCHARGE SUMMARY
[facility] Discharge Summary                                  [facility]     ERNESTO LÓPEZ [id number]   Age: 65 year old  PCP: Willian Joshua, [phone] 1955     Date of Admission:  7/27/2021  Date of Discharge::  7/28/2021  Discharge Provider:  Augie Whittaker NP    Code status:  Full Code    Admission Information:  Admission Diagnosis:  Osteoarthritis of left hip [M16.12]  Hip arthritis [M16.10]    Post-Operative Day: 1 Day Post-Op     Reason for admission:  The patient was admitted for the following:Procedure(s) (LRB):  LEFT TOTAL HIP ARTHROPLASTY (Left)    Active Problems:    Hip arthritis      Allergies:  Animal dander    Following the procedure noted above the patient was transferred to the post-op floor and started on:    Therapy:  physical therapy and occupational therapy  Anticoagulation Plan: asa 81mg BID  for 40 days  Pain Management: tylenol. Oxycodone sent home for breakthrough pain  Weight bearing status: Weight bearing as tolerated     The patient was followed by Orthopedics during the inpatient treatment course:  Complications:  None  Additional consultations:  None     Pertinent Labs   Lab Results: personally reviewed.     Recent Labs   Lab Test 07/28/21  0558   HGB 13.6          Discharge Information:  Condition at discharge: Stable  Discharge destination:  Discharged to home     Medications at discharge:  Current Discharge Medication List      START taking these medications    Details   acetaminophen (TYLENOL) 325 MG tablet Take 3 tablets (975 mg) by mouth every 6 hours    Comments: Take for the next 7 to 10 days as prescribed to provide you with optimal pain control. Alternate with your pain medication for better relief.      aspirin (ASA) 81 MG EC tablet Take 1 tablet (81 mg) by mouth 2 times daily  Qty: 80 tablet, Refills: 0    Comments: Take 1 aspirin twice a day for 40 days to prevent blood clots. Take with food. Do not take at same time of day as other  anti-inflammatories, like ibuprofen or celebrex.      oxyCODONE (ROXICODONE) 5 MG tablet Take 1 tablet (5 mg) by mouth every 4 hours as needed for breakthrough pain or moderate to severe pain  Qty: 6 tablet, Refills: 0      senna-docusate (SENOKOT-S/PERICOLACE) 8.6-50 MG tablet Take 1 tablet by mouth 2 times daily as needed for constipation    Comments: Take while taking narcotics medications and until bowels are back to normal routine. Hold for loose stools         CONTINUE these medications which have NOT CHANGED    Details   atenolol-chlorthalidone (TENORETIC) 50-25 MG tablet Take 1 tablet by mouth 2 times daily      indomethacin (INDOCIN) 50 MG capsule Take 50 mg by mouth 3 times daily as needed for moderate pain      potassium chloride ER (K-TAB) 20 MEQ CR tablet Take 20 mEq by mouth daily (with breakfast)                        Follow-Up Care:  Patient should be seen in the office in 14 days by the Orthopedic Surgeon/Physician Assistant.  Call [phone] for appointment or questions.    It was my pleasure to take care of the above patient.  Augie Whittaker NP

## 2021-07-28 NOTE — PLAN OF CARE
Occupational Therapy Discharge Summary    Reason for therapy discharge:    Discharged to home.    Progress towards therapy goal(s). See goals on Care Plan in [facility] electronic health record for goal details.  Goals met    Therapy recommendation(s):    No further therapy is recommended.    AMANDEEP Warner, CLT, 7/28/2021

## 2021-07-28 NOTE — PROGRESS NOTES
"Pt left facility accompanied by staff to return home. Pt was stable upon dc and stated they were ready for dc. Pt was not sent with prescriptions and \"how am I doing today\" education. Medication mgmt, pain mgmt, f/u's and constipation mgmt were discussed with pt who verbalized understanding/agreement.  "

## 2021-07-28 NOTE — PROGRESS NOTES
[facility]      OUTPATIENT OCCUPATIONAL THERAPY  EVALUATION  PLAN OF TREATMENT FOR OUTPATIENT REHABILITATION  (COMPLETE FOR INITIAL CLAIMS ONLY)  Patient's Last Name, First Name, M.I.  YOB: 1955  Aron Jones                          Provider's Name  [facility] Medical Record No.  [id number]                               Onset Date:  07/27/21   Start of Care Date:  07/28/21     Type:     ___PT   _X_OT   ___SLP Medical Diagnosis:  L WADE                        OT Diagnosis:  L WADE   Visits from SOC:  1   _________________________________________________________________________________  Plan of Treatment/Functional Goals    Planned Interventions: ADL retraining, transfer training   Goals: See Occupational Therapy Goals on Care Plan in [facility] [facility] electronic health record.    Therapy Frequency: Daily  Predicted Duration of Therapy Intervention: 3 days  _________________________________________________________________________________    I CERTIFY THE NEED FOR THESE SERVICES FURNISHED UNDER        THIS PLAN OF TREATMENT AND WHILE UNDER MY CARE     (Physician co-signature of this document indicates review and certification of the therapy plan).                Certification date from: 07/28/21, Certification date to: 08/28/21    Referring Physician: Erasto Max MD            Initial Assessment        See Occupational Therapy evaluation dated 07/28/21 in Epic electronic health record.

## 2021-07-28 NOTE — PLAN OF CARE
Physical Therapy Discharge Summary    Reason for therapy discharge:    All goals and outcomes met, no further needs identified.    Progress towards therapy goal(s). See goals on Care Plan in [facility] electronic health record for goal details.  Goals met    Therapy recommendation(s):    Continue home exercise program.

## 2021-07-28 NOTE — PLAN OF CARE
Problem: Joint Function Impaired (Hip Arthroplasty)  Goal: Optimal Functional Ability  7/27/2021 1922 by Rl Cuevas, RN  Outcome: Improving  7/27/2021 1450 by Rl Cuevas RN  Outcome: Improving  Intervention: Promote Functional Status  Recent Flowsheet Documentation  Taken 7/27/2021 1845 by Rl Cuevas RN  Activity Management: ambulated to bathroom   Patient vital signs are at baseline: yes  Patient able to ambulate as they were prior to admission or with assist devices provided by therapies during their stay:yes  Patient MUST void prior to discharge:  yes  Patient able to tolerate oral intake:  yes  Pain has adequate pain control using Oral analgesics:  yes  Rl Cuevas RN  7/27/2021  7:25 PM

## 2021-07-28 NOTE — PLAN OF CARE
Problem: Adult Inpatient Plan of Care  Goal: Plan of Care Review  Outcome: Adequate for Discharge  Goal: Patient-Specific Goal (Individualized)  Outcome: Adequate for Discharge  Goal: Absence of Hospital-Acquired Illness or Injury  Outcome: Adequate for Discharge  Intervention: Identify and Manage Fall Risk  Recent Flowsheet Documentation  Taken 7/28/2021 0727 by Rl Cuevas RN  Safety Promotion/Fall Prevention: activity supervised  Goal: Optimal Comfort and Wellbeing  Outcome: Adequate for Discharge  Goal: Readiness for Transition of Care  Outcome: Adequate for Discharge     Problem: Bleeding (Hip Arthroplasty)  Goal: Absence of Bleeding  Outcome: Adequate for Discharge     Problem: Bowel Motility Impaired (Hip Arthroplasty)  Goal: Effective Bowel Elimination  Outcome: Adequate for Discharge     Problem: Infection (Hip Arthroplasty)  Goal: Absence of Infection Signs and Symptoms  Outcome: Adequate for Discharge     Problem: Joint Function Impaired (Hip Arthroplasty)  Goal: Optimal Functional Ability  Outcome: Adequate for Discharge     Problem: Ongoing Anesthesia Effects (Hip Arthroplasty)  Goal: Anesthesia/Sedation Recovery  Outcome: Adequate for Discharge  Intervention: Optimize Anesthesia Recovery  Recent Flowsheet Documentation  Taken 7/28/2021 0727 by Rl Cuevas RN  Safety Promotion/Fall Prevention: activity supervised     Problem: Pain (Hip Arthroplasty)  Goal: Acceptable Pain Control  Outcome: Adequate for Discharge     Problem: Postoperative Nausea and Vomiting (Hip Arthroplasty)  Goal: Nausea and Vomiting Relief  Outcome: Adequate for Discharge     Problem: Postoperative Urinary Retention (Hip Arthroplasty)  Goal: Effective Urinary Elimination  Outcome: Adequate for Discharge     Problem: OT General Care Plan  Goal: Transfer (OT)  Description: Transfer (OT)  Outcome: Adequate for Discharge   Patient vital signs are at baseline: yes  Patient able to ambulate as they were prior to admission  "or with assist devices provided by therapies during their stay:  yes  Patient MUST void prior to discharge:  yes  Patient able to tolerate oral intake: yes  Pain has adequate pain control using Oral analgesics:  yes  /57 (BP Location: Right arm)   Pulse 57   Temp 97.9  F (36.6  C) (Oral)   Resp 18   Ht 1.727 m (5' 8\")   Wt 122.4 kg (269 lb 12.8 oz)   SpO2 95%   BMI 41.02 kg/m    Rl Cuevas RN  7/28/2021  10:16 AM    "

## 2021-07-28 NOTE — PLAN OF CARE
"[id number] shift    Problem: Adult Inpatient Plan of Care  Goal: Optimal Comfort and Wellbeing  Intervention: Provide Person-Centered Care  Recent Flowsheet Documentation  Taken 7/27/2021 2042 by Ry Gacria RN  Trust Relationship/Rapport: care explained     Problem: Adult Inpatient Plan of Care  Goal: Readiness for Transition of Care  Outcome: Improving    Patient vital signs are at baseline: Yes  Patient able to ambulate as they were prior to admission or with assist devices provided by therapies during their stay:  Yes  Patient MUST void prior to discharge:  Yes  Patient able to tolerate oral intake:  Yes  Pain has adequate pain control using Oral analgesics:  Yes    Pt AOx4. Ambulated with nursing assistant in the hallway 100ft. Pt reports pain is well controlled. Voiding adequately. No complaints of nausea.    /62 (BP Location: Right arm)   Pulse 65   Temp 98.3  F (36.8  C) (Oral)   Resp 16   Ht 1.727 m (5' 8\")   Wt 122.4 kg (269 lb 12.8 oz)   SpO2 94%   BMI 41.02 kg/m        Ry Garcia RN    "

## 2021-07-28 NOTE — PROGRESS NOTES
"[facility] Progress Note      Post-operative Day: 1 Day Post-Op    Procedure(s):  LEFT TOTAL HIP ARTHROPLASTY        Plan: Anticoagulation protocol: asa 81mg BID  X 40 days            Pain medications:  Tylenol with oxycodone for breakthrough            Weight bearing status:  WBAT            Disposition:  Home today             Continue cares and rehabilitation     Subjective:  Patient states, \"my pain is better than before surgery.\" Passed therapies   Pain: minimal  Chest pain, SOB:  No  Denies lightheadedness/dizziness  Denies nausea/ vomiting  Passing flatus  voiding well    Objective:  Blood pressure 126/59, pulse 54, temperature 97.4  F (36.3  C), temperature source Oral, resp. rate 16, height 1.727 m (5' 8\"), weight 122.4 kg (269 lb 12.8 oz), SpO2 94 %.    Patient Vitals for the past 24 hrs:   BP Temp Temp src Pulse Resp SpO2   07/28/21 0340 126/59 97.4  F (36.3  C) Oral 54 16 94 %   07/27/21 2322 138/62 98.3  F (36.8  C) Oral 65 16 --   07/27/21 2008 114/58 98.3  F (36.8  C) Oral 56 16 94 %   07/27/21 1845 131/62 98.4  F (36.9  C) Oral 66 18 93 %   07/27/21 1445 121/57 -- -- 71 18 94 %   07/27/21 1345 119/60 -- -- 69 18 94 %   07/27/21 1245 123/60 -- -- 53 18 92 %   07/27/21 1215 96/51 -- -- 55 18 93 %   07/27/21 1145 127/60 97.9  F (36.6  C) Oral 56 18 94 %   07/27/21 1100 125/67 97.6  F (36.4  C) Temporal 52 20 96 %   07/27/21 1050 123/65 -- -- 54 18 97 %   07/27/21 1035 118/62 -- -- 54 18 96 %   07/27/21 1030 111/54 -- -- 53 20 96 %   07/27/21 1020 124/58 97.8  F (36.6  C) Temporal 54 20 97 %   07/27/21 1010 121/80 -- -- 54 18 92 %   07/27/21 1000 114/58 -- -- 55 18 93 %   07/27/21 0950 113/58 -- -- 53 20 100 %       Wt Readings from Last 4 Encounters:   07/27/21 122.4 kg (269 lb 12.8 oz)         Motor function, sensation, and circulation intact   Yes  Wound status: incisions are clean dry and intact. Yes with pinpoint bleeding under aquacewl  Calf tenderness: Bilateral  No    Pertinent Labs "   Lab Results: personally reviewed.     Recent Labs   Lab Test 07/28/21  0558   HGB 13.6       Report completed by:  Augie Whittaker NP  Date: 7/28/2021  Time: 9:47 AM

## (undated) DEVICE — SOL NACL 0.9% INJ 1000ML BAG 2B1324X

## (undated) DEVICE — DRSG TEGADERM 2 3/8X2 3/4" 1624W

## (undated) DEVICE — SOL NACL 0.9% IRRIG 1000ML BOTTLE 2F7124

## (undated) DEVICE — SOL WATER IRRIG 1000ML BOTTLE 2F7114

## (undated) DEVICE — GLOVE BIOGEL PI SZ 8.0 40880

## (undated) DEVICE — DRSG AQUACEL AG 3.5X9.75" HYDROFIBER 412011

## (undated) DEVICE — GOWN IMPERVIOUS BREATHABLE SMART XLG 89045

## (undated) DEVICE — SUTURE VICRYL+ 1 18 CT/CR  VLT VCP753D

## (undated) DEVICE — BONE CLEANING TIP INTERPULSE  0210-010-000

## (undated) DEVICE — HOLDER LIMB VELCRO OR 0814-1533

## (undated) DEVICE — SUTURE PDS 1 CTB-1 ZB347

## (undated) DEVICE — DRAPE IOBAN INCISE 23X17" 6650EZ

## (undated) DEVICE — GLOVE UNDER INDICATOR PI SZ 7.0 LF 41670

## (undated) DEVICE — DRILL BIT FLEXIBLE REFLECTION 25MM  71362925

## (undated) DEVICE — GOWN IMPERVIOUS BREATHABLE 2XL/XLONG

## (undated) DEVICE — POSITIONER HEAD ADULT FP-HEADCR

## (undated) DEVICE — DRSG DRAIN 2X2" 7087

## (undated) DEVICE — KIT DRAIN CLOSED WOUND SUCTION MED 400ML RESVR

## (undated) DEVICE — SUCTION SLEEVE NEPTUNE 2 165MM 0703-005-165

## (undated) DEVICE — GLOVE BIOGEL INDICATOR 7.5 LF 41675

## (undated) DEVICE — PILLOW HIP ABDUCTION CONCAVE 1.9

## (undated) DEVICE — PLATE GROUNDING ADULT W/CORD 9165L

## (undated) DEVICE — DRSG STERI STRIP 1X5" R1548

## (undated) DEVICE — BLADE SAW SAGITTAL STRK WIDE 25.4X85X1.2MM 2108-151-000

## (undated) DEVICE — CUSTOM PACK TOTAL HIP SOP5BTHHEA

## (undated) DEVICE — GUIDE PIN LONG ACETABULAR: Type: IMPLANTABLE DEVICE | Site: HIP | Status: NON-FUNCTIONAL

## (undated) DEVICE — ESU ELEC BLADE 6" COATED E1450-6

## (undated) DEVICE — SUCTION MANIFOLD NEPTUNE 2 SYS 4 PORT 0702-020-000

## (undated) DEVICE — SUTURE VICRYL+ 2-0 27IN CT-1 UND VCP259H

## (undated) DEVICE — SU MONOCRYL 3-0 PS-2 18" UND MCP497G

## (undated) DEVICE — HOOD FLYTE SURGICOOL

## (undated) DEVICE — NEEDLE HYPO 22X1-1/2 SAFETY 305900

## (undated) DEVICE — GLOVE BIOGEL PI ULTRATOUCH G SZ 8.0 42180

## (undated) DEVICE — DECANTER VIAL 2006S

## (undated) DEVICE — GLOVE BIOGEL PI ULTRATOUCH G SZ 7.0 42170

## (undated) DEVICE — GLOVE BIOGEL PI INDICATOR 8.0 LF 41680

## (undated) DEVICE — DRSG TEGADERM 4X4 3/4" 1626W

## (undated) DEVICE — HOOD FLYTE SURGICOOL WITH PEEL AWAY

## (undated) DEVICE — SUCTION IRR SYSTEM W/O TIP INTERPULSE HANDPIECE 0210-100-000